# Patient Record
Sex: FEMALE | Race: WHITE | Employment: PART TIME | URBAN - METROPOLITAN AREA
[De-identification: names, ages, dates, MRNs, and addresses within clinical notes are randomized per-mention and may not be internally consistent; named-entity substitution may affect disease eponyms.]

---

## 2019-11-01 ENCOUNTER — HOSPITAL ENCOUNTER (EMERGENCY)
Facility: CLINIC | Age: 34
Discharge: HOME | End: 2019-11-01
Attending: EMERGENCY MEDICINE | Admitting: EMERGENCY MEDICINE
Payer: COMMERCIAL

## 2019-11-01 VITALS
HEART RATE: 84 BPM | RESPIRATION RATE: 16 BRPM | DIASTOLIC BLOOD PRESSURE: 95 MMHG | SYSTOLIC BLOOD PRESSURE: 146 MMHG | TEMPERATURE: 98.1 F | OXYGEN SATURATION: 99 % | BODY MASS INDEX: 35.08 KG/M2 | WEIGHT: 198 LBS | HEIGHT: 63 IN

## 2019-11-01 DIAGNOSIS — Z51.89 VISIT FOR WOUND CHECK: ICD-10-CM

## 2019-11-01 PROCEDURE — 99283 EMERGENCY DEPT VISIT LOW MDM: CPT | Mod: 25

## 2019-11-01 PROCEDURE — 12002 RPR S/N/AX/GEN/TRNK2.6-7.5CM: CPT

## 2019-11-01 ASSESSMENT — MIFFLIN-ST. JEOR: SCORE: 1582.25

## 2019-11-01 ASSESSMENT — ENCOUNTER SYMPTOMS: WOUND: 1

## 2019-11-01 NOTE — ED AVS SNAPSHOT
Emergency Department  [address] [place] [id number]  Phone:  [phone]  Fax:  [phone]                                    Trevor Sheikh   MRN: [id number]    Department:   Emergency Department   Date of Visit:  11/1/2019           After Visit Summary Signature Page    I have received my discharge instructions, and my questions have been answered. I have discussed any challenges I see with this plan with the nurse or doctor.    ..........................................................................................................................................  Patient/Patient Representative Signature      ..........................................................................................................................................  Patient Representative Print Name and Relationship to Patient    ..................................................               ................................................  Date                                   Time    ..........................................................................................................................................  Reviewed by Signature/Title    ...................................................              ..............................................  Date                                               Time          22EPIC Rev 08/18

## 2019-11-02 NOTE — ED TRIAGE NOTES
8 days ago pt had I&D of neck (for MRSA),  removed stitches tonight and popped open, wants it to be checked

## 2019-11-02 NOTE — ED PROVIDER NOTES
"  History     Chief Complaint:  Post-op Problem     HPI   Trevor Sheikh is a 33 year old female who presents with post op problem. The patient states that she had a MRSA infection and had an incision and drainage 8 days ago on her neck. The patient was told to either remove the stitches herself or find someone to remove it between 5 and 10 days. The patient states that her  removed it tonight and within a minute the wound popped open. The patient is no longer on antibiotics.     Allergies:  No Known Allergies     Medications:    No daily medication.     Past Medical History:    methicillin resistant staph aureus culture positive     Past Surgical History:    History reviewed. No pertinent surgical history.    Family History:  'History reviewed. No pertinent family history.    Social History:  The patient was accompanied to the ED by .  Smoking Status: Never Smoker  Smokeless Tobacco: Never Used  Alcohol Use: Not currently  Drug Use: Not currently     Review of Systems   Skin: Positive for wound.   All other systems reviewed and are negative.    Physical Exam     Patient Vitals for the past 24 hrs:   BP Temp Temp src Pulse Resp SpO2 Height Weight   11/01/19 2038 (!) 146/95 98.1  F (36.7  C) Oral 84 16 99 % 1.6 m (5' 3\") 89.8 kg (198 lb)     Physical Exam  Vitals: reviewed by me  General: Pt seen on [facility], cooperative, and alert to conversation  Eyes: Tracking well, clear conjunctiva BL  ENT: MMM, midline trachea.   Lungs:   No tachypnea, no accessory muscle use. No respiratory distress.   CV: Rate as above, regular rhythm.    MSK: no peripheral edema or joint effusion.  Back of neck with a 3 cm x 2 cm area of wound dehiscence.  No surrounding erythema, appears to be granulating well, nontender, nonfluctuant.  Skin: No rash, normal turgor and temperature  Neuro: Clear speech and no facial droop.  Psych: Not RIS, no e/o AH/VH        Emergency Department Course     Procedures     " Laceration Repair        LACERATION:  A simple clean 3 cm laceration.      LOCATION:  Back of neck       ANESTHESIA:  Local using Lidocaine 1% total of 2 mLs       DEBRIDEMENT:  no debridement      CLOSURE:  Wound was closed with One Layer.  Skin closed with 1 x 4.0 Vicryl Sutures       Emergency Department Course:  Nursing notes and vitals reviewed.    2114 I performed an exam of the patient as documented above.     2122 I preformed the laceration repair as noted above.     Findings and plan explained to the Patient. Patient discharged home with instructions regarding supportive care, medications, and reasons to return. The importance of close follow-up was reviewed.     Impression & Plan    Medical Decision Making:  Trevor Sheikh is a very pleasant 33 year old female who presents to the emergency department with what appears to be wound dehiscence after removing sutures at home. The wound is gapping roughly 2 cm and is short access, and for this reason alone I did place a single loose stitch for cosmetic reasons. The wound otherwise appears to be granulating well with no signs of infection. Will discharge home with basic wound care instructions to let it drain and keep it covered. She knows to follow with her primary care physician in one week with any other questions. Will discharge as above.     Diagnosis:    ICD-10-CM    1. Visit for wound check Z51.89        Disposition:   The patient is discharged to home.    Discharge Medications:  No discharge medication.     Scribe Disclosure:  I, Amira Parekhion, am serving as a scribe at 9:16 PM on 11/1/2019 to document services personally performed by Clement Caraballo MD based on my observations and the provider's statements to me.    EMERGENCY DEPARTMENT       Clement Caraballo MD  11/01/19 8802

## 2019-11-02 NOTE — DISCHARGE INSTRUCTIONS
You were given an absorbable suture today, for cosmetic reasons.  Please allow the wound to drain, please change the bandage around your wound every 24 hours.  Come back to the ER if becomes red or swollen.